# Patient Record
Sex: MALE | Race: WHITE | Employment: OTHER | URBAN - METROPOLITAN AREA
[De-identification: names, ages, dates, MRNs, and addresses within clinical notes are randomized per-mention and may not be internally consistent; named-entity substitution may affect disease eponyms.]

---

## 2017-12-27 ENCOUNTER — HOSPITAL ENCOUNTER (OUTPATIENT)
Age: 42
Discharge: HOME | End: 2017-12-27
Attending: FAMILY MEDICINE
Payer: COMMERCIAL

## 2017-12-27 VITALS
SYSTOLIC BLOOD PRESSURE: 146 MMHG | HEIGHT: 73 IN | TEMPERATURE: 98 F | OXYGEN SATURATION: 100 % | HEART RATE: 100 BPM | BODY MASS INDEX: 26.51 KG/M2 | WEIGHT: 200 LBS | DIASTOLIC BLOOD PRESSURE: 71 MMHG | RESPIRATION RATE: 20 BRPM

## 2017-12-27 DIAGNOSIS — J02.0 STREPTOCOCCAL SORE THROAT: Primary | ICD-10-CM

## 2017-12-27 PROCEDURE — 99204 OFFICE O/P NEW MOD 45 MIN: CPT

## 2017-12-27 PROCEDURE — 99203 OFFICE O/P NEW LOW 30 MIN: CPT

## 2017-12-27 PROCEDURE — 87430 STREP A AG IA: CPT

## 2017-12-27 RX ORDER — PAROXETINE HYDROCHLORIDE 25 MG/1
25 TABLET, FILM COATED, EXTENDED RELEASE ORAL EVERY MORNING
COMMUNITY
End: 2021-06-10

## 2017-12-27 RX ORDER — AMOXICILLIN 875 MG/1
875 TABLET, COATED ORAL 2 TIMES DAILY
Qty: 20 TABLET | Refills: 0 | Status: SHIPPED | OUTPATIENT
Start: 2017-12-27 | End: 2018-01-06

## 2017-12-27 NOTE — ED PROVIDER NOTES
Patient presents with:  Sore Throat      HPI:     Dylan Hubbard is a 40year old male who presents with for chief complaint of fever, nasal congestion, sore throat   X 1 days.     The patient denies complaints of headache, neck pain, ear pain, difficult organomegaly  Skin: Skin color, texture, turgor normal. No rashes or lesions      Assessment/Plan:     Labs performed this visit:    Recent Results (from the past 10 hour(s))  -[facility] POCT RAPID STREP   Collection Time: 12/27/17  8:28 AM   Result Value Ref Ra

## 2018-01-31 ENCOUNTER — HOSPITAL ENCOUNTER (OUTPATIENT)
Age: 43
Discharge: HOME | End: 2018-01-31
Attending: FAMILY MEDICINE
Payer: COMMERCIAL

## 2018-01-31 VITALS
SYSTOLIC BLOOD PRESSURE: 136 MMHG | HEART RATE: 97 BPM | WEIGHT: 200 LBS | RESPIRATION RATE: 18 BRPM | TEMPERATURE: 99 F | OXYGEN SATURATION: 99 % | DIASTOLIC BLOOD PRESSURE: 86 MMHG | HEIGHT: 73 IN | BODY MASS INDEX: 26.51 KG/M2

## 2018-01-31 DIAGNOSIS — J11.1 INFLUENZA-LIKE ILLNESS: Primary | ICD-10-CM

## 2018-01-31 LAB — S PYO AG THROAT QL: NEGATIVE

## 2018-01-31 PROCEDURE — 99213 OFFICE O/P EST LOW 20 MIN: CPT

## 2018-01-31 PROCEDURE — 99212 OFFICE O/P EST SF 10 MIN: CPT

## 2018-01-31 PROCEDURE — 87430 STREP A AG IA: CPT

## 2018-01-31 NOTE — ED PROVIDER NOTES
Patient presents with:  Cough/URI    HPI:     Margo Lanier is a 40year old male who presents with for chief complaint of fevers, chills, chest congestion, cough, headaches and body aches.   Onset of symptoms was 3 days  The patient denies complaints o rub or gallop, regular rate and rhythm  Abdomen: soft, non-tender; bowel sounds normal; no masses,  no organomegaly  Skin: Skin color, texture, turgor normal. No rashes or lesions      Assessment/Plan:     Labs performed this visit:    Recent Results (from

## 2018-01-31 NOTE — ED INITIAL ASSESSMENT (HPI)
\"not feeling well\"   Fever  Body aches  Symptoms  Chest congestion with phlegm  Monday pt.  Started with symptoms  -flu shot  No nausea or vomiting or diarrhea

## 2018-02-04 ENCOUNTER — HOSPITAL (OUTPATIENT)
Dept: OTHER | Age: 43
End: 2018-02-04
Attending: EMERGENCY MEDICINE

## 2018-02-04 LAB
ANALYZER ANC (IANC): NORMAL
ANION GAP SERPL CALC-SCNC: 12 MMOL/L (ref 10–20)
BASOPHILS # BLD: 0 THOUSAND/MCL (ref 0–0.3)
BASOPHILS NFR BLD: 0 %
BUN SERPL-MCNC: 12 MG/DL (ref 6–20)
BUN/CREAT SERPL: 13 (ref 7–25)
CALCIUM SERPL-MCNC: 9.3 MG/DL (ref 8.4–10.2)
CHLORIDE: 101 MMOL/L (ref 98–107)
CO2 SERPL-SCNC: 32 MMOL/L (ref 21–32)
CREAT SERPL-MCNC: 0.89 MG/DL (ref 0.67–1.17)
DIFFERENTIAL METHOD BLD: NORMAL
EOSINOPHIL # BLD: 0.1 THOUSAND/MCL (ref 0.1–0.5)
EOSINOPHIL NFR BLD: 3 %
ERYTHROCYTE [DISTWIDTH] IN BLOOD: 12.2 % (ref 11–15)
GLUCOSE SERPL-MCNC: 102 MG/DL (ref 65–99)
HEMATOCRIT: 43.1 % (ref 39–51)
HGB BLD-MCNC: 14.5 GM/DL (ref 13–17)
LYMPHOCYTES # BLD: 1.9 THOUSAND/MCL (ref 1–4.8)
LYMPHOCYTES NFR BLD: 33 %
MCH RBC QN AUTO: 29.4 PG (ref 26–34)
MCHC RBC AUTO-ENTMCNC: 33.6 GM/DL (ref 32–36.5)
MCV RBC AUTO: 87.4 FL (ref 78–100)
MONOCYTES # BLD: 0.3 THOUSAND/MCL (ref 0.3–0.9)
MONOCYTES NFR BLD: 5 %
NEUTROPHILS # BLD: 3.3 THOUSAND/MCL (ref 1.8–7.7)
NEUTROPHILS NFR BLD: 59 %
NEUTS SEG NFR BLD: NORMAL %
PERCENT NRBC: NORMAL
PLATELET # BLD: 227 THOUSAND/MCL (ref 140–450)
POTASSIUM SERPL-SCNC: 4 MMOL/L (ref 3.4–5.1)
PROCALCITONIN SERPL IA-MCNC: <0.05 NG/ML
RBC # BLD: 4.93 MILLION/MCL (ref 4.5–5.9)
SODIUM SERPL-SCNC: 141 MMOL/L (ref 135–145)
WBC # BLD: 5.7 THOUSAND/MCL (ref 4.2–11)

## 2018-06-14 PROBLEM — I83.893 VARICOSE VEINS OF BOTH LOWER EXTREMITIES WITH COMPLICATIONS: Status: ACTIVE | Noted: 2018-06-14

## 2019-01-08 ENCOUNTER — APPOINTMENT (OUTPATIENT)
Dept: LAB | Facility: HOSPITAL | Age: 44
End: 2019-01-08
Attending: NURSE PRACTITIONER
Payer: COMMERCIAL

## 2019-01-08 ENCOUNTER — OFFICE VISIT (OUTPATIENT)
Dept: GASTROENTEROLOGY | Facility: CLINIC | Age: 44
End: 2019-01-08
Payer: COMMERCIAL

## 2019-01-08 ENCOUNTER — TELEPHONE (OUTPATIENT)
Dept: GASTROENTEROLOGY | Facility: CLINIC | Age: 44
End: 2019-01-08

## 2019-01-08 VITALS
HEART RATE: 69 BPM | HEIGHT: 73 IN | SYSTOLIC BLOOD PRESSURE: 123 MMHG | BODY MASS INDEX: 27.3 KG/M2 | DIASTOLIC BLOOD PRESSURE: 64 MMHG | WEIGHT: 206 LBS

## 2019-01-08 DIAGNOSIS — R19.4 ALTERED BOWEL HABITS: ICD-10-CM

## 2019-01-08 DIAGNOSIS — R19.4 ALTERED BOWEL HABITS: Primary | ICD-10-CM

## 2019-01-08 LAB — IGA SERPL-MCNC: 272 MG/DL (ref 68–378)

## 2019-01-08 PROCEDURE — 82784 ASSAY IGA/IGD/IGG/IGM EACH: CPT | Performed by: NURSE PRACTITIONER

## 2019-01-08 PROCEDURE — 99212 OFFICE O/P EST SF 10 MIN: CPT | Performed by: NURSE PRACTITIONER

## 2019-01-08 PROCEDURE — 36415 COLL VENOUS BLD VENIPUNCTURE: CPT | Performed by: NURSE PRACTITIONER

## 2019-01-08 PROCEDURE — 99203 OFFICE O/P NEW LOW 30 MIN: CPT | Performed by: NURSE PRACTITIONER

## 2019-01-08 PROCEDURE — 83516 IMMUNOASSAY NONANTIBODY: CPT

## 2019-01-08 NOTE — TELEPHONE ENCOUNTER
Scheduled for:  Colonoscopy  Provider Name: Nyasia Crowley  Date:  1/25/19  Location: [facility]   Sedation:  IV  Time:  11am arrival 10am  Prep:colyte  Meds/Allergies Reconciled?:  Nubia Lara reviewed  Diagnosis with codes:  Altered bowel habits R19.4  Was patient inf

## 2019-01-08 NOTE — H&P
[address] [facility] [address] Gastroenterology                                                                                                  Clinic History and Physical     Pa Occasional etoh  - Denies illicit drug use   - Lives with spouse  - NSAIDs/ASA use: PRN    History, Medications, Allergies, ROS:      History reviewed. No pertinent past medical history.    Past Surgical History:   Procedure Laterality Date   • REMOVAL GALL wheezes or rales  GI: soft, non-tender exam in all quadrants without rigidity or guarding, non-distended, no abnormal bowel sounds noted, no masses are palpated, rectal exam deferred until endoscopic evaluation  : no CVA tenderness  Skin: dry, warm, no j Dr. Shamar Batista with IV twilight or MAC  -Prep: Split dose Colyte or equivalent  -Anti-platelets and anti-coagulants: None  -Diabetes meds: None  If MAC sedation @ [facility]:  -Patient denies ACE/ARB/ED Rx/herbal/diet supplements/recreational drugs  -Ho

## 2019-01-08 NOTE — PATIENT INSTRUCTIONS
1. Schedule colonoscopy with Dr. Slade Rick or Dr. Molly Christian w/ IV Bogata or MAC               2.  bowel prep from pharmacy - split dose Colyte    3. Continue all medications for procedure except: see office visit note    4.  Read all bowel prep inst

## 2019-01-09 LAB — TTG IGA SER-ACNC: 0.6 U/ML (ref ?–7)

## 2019-01-13 ENCOUNTER — LAB ENCOUNTER (OUTPATIENT)
Dept: LAB | Facility: HOSPITAL | Age: 44
End: 2019-01-13
Attending: NURSE PRACTITIONER
Payer: COMMERCIAL

## 2019-01-13 DIAGNOSIS — R19.4 ALTERED BOWEL HABITS: ICD-10-CM

## 2019-01-13 PROCEDURE — 87427 SHIGA-LIKE TOXIN AG IA: CPT

## 2019-01-13 PROCEDURE — 87046 STOOL CULTR AEROBIC BACT EA: CPT

## 2019-01-13 PROCEDURE — 87272 CRYPTOSPORIDIUM AG IF: CPT

## 2019-01-13 PROCEDURE — 87045 FECES CULTURE AEROBIC BACT: CPT

## 2019-01-13 PROCEDURE — 87493 C DIFF AMPLIFIED PROBE: CPT

## 2019-01-13 PROCEDURE — 87329 GIARDIA AG IA: CPT

## 2019-01-14 LAB
C DIFF TOX B STL QL: NEGATIVE
CRYPTOSP AG STL QL IA: NEGATIVE
G LAMBLIA AG STL QL IA: NEGATIVE

## 2019-01-15 NOTE — TELEPHONE ENCOUNTER
Please obtain PA for procedure being done at [facility].     Thank you, Tammy Goodson Small-Referral Specialist.

## 2019-01-15 NOTE — TELEPHONE ENCOUNTER
Pt Surgery/Procedure: colon [id number]  Pt insurance/number to contact: [id number]  Insurance ID# and group: [id number]/ [id number]  Dx: R19.4  CPT: 65772  Surgeon: Dr. Cinda Weston  Procedure scheduled where: [facility]   Procedure scheduled inpt/outpt: outpt  Date of surgery:

## 2019-01-16 ENCOUNTER — TELEPHONE (OUTPATIENT)
Dept: GASTROENTEROLOGY | Facility: CLINIC | Age: 44
End: 2019-01-16

## 2019-01-16 NOTE — TELEPHONE ENCOUNTER
----- Message from BRIGHT Baca sent at 1/9/2019  3:08 PM CST -----  Nursing: Please let the patient know that his celiac testing was negative.   I have not yet received the results of the stool tests but will forward on the results of this once the

## 2019-01-16 NOTE — TELEPHONE ENCOUNTER
[address]- FYI stool culture results are back in -- please review and send back to RN pool to call patient.

## 2019-01-16 NOTE — TELEPHONE ENCOUNTER
Notified patient per [facility] result note from 1/16/19 that both stool culture/and celiac testing was negative. No indication of any infection. Patient fully understood all that was stated and had no further questions/concerns.

## 2019-01-17 NOTE — TELEPHONE ENCOUNTER
----- Message from BRIGHT Marie sent at 1/16/2019 12:44 PM CST -----  Nursing: Please let the patient know that his stool testing was negative. No indication of any infection.

## 2019-01-18 ENCOUNTER — TELEPHONE (OUTPATIENT)
Dept: GASTROENTEROLOGY | Facility: CLINIC | Age: 44
End: 2019-01-18

## 2019-01-18 DIAGNOSIS — R19.4 ALTERED BOWEL HABITS: Primary | ICD-10-CM

## 2019-01-18 NOTE — TELEPHONE ENCOUNTER
Patient is cancelling his colonoscopy  due to busy work schedule. Would like to reschedule at a later time. Cindy Leyva Appointment cancelled in [place] [id number]. Surgical case request sent to endo. Message sent to doctor. Message sent to GI schedulers.

## 2019-05-08 NOTE — TELEPHONE ENCOUNTER
CB, unable to LM to schedule procedure, mailbox full. Please transfer to [facility] at ext [phone] or [phone] for scheduling. Or please transfer to [facility] in GI if unavailable.

## 2019-08-02 NOTE — TELEPHONE ENCOUNTER
CBLM to schedule procedure. Please transfer to [facility] in GI    3 attempts were made to contact this patient to schedule a procedure.     I sent a \"no response\" letter out today     Closing this TE

## 2021-06-10 PROBLEM — E55.9 VITAMIN D DEFICIENCY: Status: ACTIVE | Noted: 2021-06-10

## 2022-01-26 PROBLEM — N13.8 BPH WITH OBSTRUCTION/LOWER URINARY TRACT SYMPTOMS: Status: ACTIVE | Noted: 2022-01-26

## 2022-01-26 PROBLEM — N40.1 BPH WITH OBSTRUCTION/LOWER URINARY TRACT SYMPTOMS: Status: ACTIVE | Noted: 2022-01-26

## 2023-01-09 ENCOUNTER — HOSPITAL ENCOUNTER (OUTPATIENT)
Age: 48
Discharge: HOME | End: 2023-01-09
Payer: COMMERCIAL

## 2023-01-09 VITALS
RESPIRATION RATE: 20 BRPM | TEMPERATURE: 98 F | OXYGEN SATURATION: 100 % | SYSTOLIC BLOOD PRESSURE: 131 MMHG | DIASTOLIC BLOOD PRESSURE: 69 MMHG | HEART RATE: 79 BPM

## 2023-01-09 DIAGNOSIS — J06.9 VIRAL URI: Primary | ICD-10-CM

## 2023-01-09 LAB
POCT INFLUENZA A: NEGATIVE
POCT INFLUENZA B: NEGATIVE
S PYO AG THROAT QL: NEGATIVE
SARS-COV-2 RNA RESP QL NAA+PROBE: NOT DETECTED

## 2023-01-09 PROCEDURE — 87502 INFLUENZA DNA AMP PROBE: CPT | Performed by: NURSE PRACTITIONER

## 2023-01-09 PROCEDURE — 87880 STREP A ASSAY W/OPTIC: CPT | Performed by: NURSE PRACTITIONER

## 2023-01-09 PROCEDURE — U0002 COVID-19 LAB TEST NON-CDC: HCPCS | Performed by: NURSE PRACTITIONER

## 2023-01-09 PROCEDURE — 99203 OFFICE O/P NEW LOW 30 MIN: CPT | Performed by: NURSE PRACTITIONER

## 2023-01-09 NOTE — ED INITIAL ASSESSMENT (HPI)
Patient is here with complaints of a sore throat for the last week and feeling tired and wiped out for the last week.

## 2023-05-22 ENCOUNTER — HOSPITAL ENCOUNTER (OUTPATIENT)
Dept: CT IMAGING | Age: 48
Discharge: HOME | End: 2023-05-22
Attending: OTOLARYNGOLOGY

## 2023-05-22 DIAGNOSIS — I88.9 CERVICAL LYMPHADENITIS: ICD-10-CM

## 2023-05-22 DIAGNOSIS — I88.9 CERVICAL LYMPHADENITIS: Primary | ICD-10-CM

## 2023-05-22 PROCEDURE — 10002805 HB CONTRAST AGENT: Performed by: OTOLARYNGOLOGY

## 2023-05-22 PROCEDURE — G1004 CDSM NDSC: HCPCS

## 2023-05-22 PROCEDURE — 70491 CT SOFT TISSUE NECK W/DYE: CPT

## 2023-05-22 RX ADMIN — IOHEXOL 80 ML: 350 INJECTION, SOLUTION INTRAVENOUS at 18:13

## 2023-05-25 ENCOUNTER — HOSPITAL ENCOUNTER (EMERGENCY)
Age: 48
Discharge: HOME | End: 2023-05-25
Attending: EMERGENCY MEDICINE

## 2023-05-25 ENCOUNTER — APPOINTMENT (OUTPATIENT)
Dept: CT IMAGING | Age: 48
End: 2023-05-25
Attending: EMERGENCY MEDICINE

## 2023-05-25 VITALS
RESPIRATION RATE: 16 BRPM | OXYGEN SATURATION: 98 % | TEMPERATURE: 99.5 F | HEIGHT: 73 IN | WEIGHT: 197.97 LBS | BODY MASS INDEX: 26.24 KG/M2 | DIASTOLIC BLOOD PRESSURE: 89 MMHG | HEART RATE: 78 BPM | SYSTOLIC BLOOD PRESSURE: 149 MMHG

## 2023-05-25 DIAGNOSIS — R07.0 THROAT PAIN: Primary | ICD-10-CM

## 2023-05-25 LAB
ALBUMIN SERPL-MCNC: 3.8 G/DL (ref 3.6–5.1)
ALBUMIN/GLOB SERPL: 0.7 {RATIO} (ref 1–2.4)
ALP SERPL-CCNC: 77 UNITS/L (ref 45–117)
ALT SERPL-CCNC: 41 UNITS/L
ANION GAP SERPL CALC-SCNC: 7 MMOL/L (ref 7–19)
AST SERPL-CCNC: 23 UNITS/L
BASOPHILS # BLD: 0 K/MCL (ref 0–0.3)
BASOPHILS NFR BLD: 0 %
BILIRUB SERPL-MCNC: 0.5 MG/DL (ref 0.2–1)
BUN SERPL-MCNC: 16 MG/DL (ref 6–20)
BUN/CREAT SERPL: 19 (ref 7–25)
CALCIUM SERPL-MCNC: 9.7 MG/DL (ref 8.4–10.2)
CHLORIDE SERPL-SCNC: 104 MMOL/L (ref 97–110)
CO2 SERPL-SCNC: 30 MMOL/L (ref 21–32)
CREAT SERPL-MCNC: 0.83 MG/DL (ref 0.67–1.17)
CRP SERPL-MCNC: 9.8 MG/DL
DEPRECATED RDW RBC: 38.5 FL (ref 39–50)
EOSINOPHIL # BLD: 0 K/MCL (ref 0–0.5)
EOSINOPHIL NFR BLD: 0 %
ERYTHROCYTE [DISTWIDTH] IN BLOOD: 11.9 % (ref 11–15)
ERYTHROCYTE [SEDIMENTATION RATE] IN BLOOD BY WESTERGREN METHOD: 73 MM/HR (ref 0–20)
FASTING DURATION TIME PATIENT: ABNORMAL H
GFR SERPLBLD BASED ON 1.73 SQ M-ARVRAT: >90 ML/MIN
GLOBULIN SER-MCNC: 5.1 G/DL (ref 2–4)
GLUCOSE SERPL-MCNC: 145 MG/DL (ref 70–99)
HCT VFR BLD CALC: 47.2 % (ref 39–51)
HGB BLD-MCNC: 15.4 G/DL (ref 13–17)
IMM GRANULOCYTES # BLD AUTO: 0.1 K/MCL (ref 0–0.2)
IMM GRANULOCYTES # BLD: 0 %
LYMPHOCYTES # BLD: 0.5 K/MCL (ref 1–4.8)
LYMPHOCYTES NFR BLD: 3 %
MCH RBC QN AUTO: 29.1 PG (ref 26–34)
MCHC RBC AUTO-ENTMCNC: 32.6 G/DL (ref 32–36.5)
MCV RBC AUTO: 89.2 FL (ref 78–100)
MONOCYTES # BLD: 0.4 K/MCL (ref 0.3–0.9)
MONOCYTES NFR BLD: 3 %
NEUTROPHILS # BLD: 13.8 K/MCL (ref 1.8–7.7)
NEUTROPHILS NFR BLD: 94 %
NRBC BLD MANUAL-RTO: 0 /100 WBC
PLATELET # BLD AUTO: 302 K/MCL (ref 140–450)
POTASSIUM SERPL-SCNC: 3.7 MMOL/L (ref 3.4–5.1)
PROT SERPL-MCNC: 8.9 G/DL (ref 6.4–8.2)
RAINBOW EXTRA TUBES HOLD SPECIMEN: NORMAL
RAINBOW EXTRA TUBES HOLD SPECIMEN: NORMAL
RBC # BLD: 5.29 MIL/MCL (ref 4.5–5.9)
SODIUM SERPL-SCNC: 137 MMOL/L (ref 135–145)
TSH SERPL-ACNC: 0.49 MCUNITS/ML (ref 0.35–5)
WBC # BLD: 14.8 K/MCL (ref 4.2–11)

## 2023-05-25 PROCEDURE — 70491 CT SOFT TISSUE NECK W/DYE: CPT

## 2023-05-25 PROCEDURE — 96365 THER/PROPH/DIAG IV INF INIT: CPT

## 2023-05-25 PROCEDURE — 10002807 HB RX 258: Performed by: EMERGENCY MEDICINE

## 2023-05-25 PROCEDURE — 10002805 HB CONTRAST AGENT: Performed by: EMERGENCY MEDICINE

## 2023-05-25 PROCEDURE — 85652 RBC SED RATE AUTOMATED: CPT | Performed by: EMERGENCY MEDICINE

## 2023-05-25 PROCEDURE — 80053 COMPREHEN METABOLIC PANEL: CPT | Performed by: EMERGENCY MEDICINE

## 2023-05-25 PROCEDURE — 99283 EMERGENCY DEPT VISIT LOW MDM: CPT

## 2023-05-25 PROCEDURE — 96375 TX/PRO/DX INJ NEW DRUG ADDON: CPT

## 2023-05-25 PROCEDURE — 86140 C-REACTIVE PROTEIN: CPT | Performed by: EMERGENCY MEDICINE

## 2023-05-25 PROCEDURE — 84443 ASSAY THYROID STIM HORMONE: CPT | Performed by: EMERGENCY MEDICINE

## 2023-05-25 PROCEDURE — 85025 COMPLETE CBC W/AUTO DIFF WBC: CPT | Performed by: EMERGENCY MEDICINE

## 2023-05-25 PROCEDURE — 10002800 HB RX 250 W HCPCS: Performed by: EMERGENCY MEDICINE

## 2023-05-25 RX ORDER — PAROXETINE HCL 25 MG
TABLET, EXTENDED RELEASE 24 HR ORAL
COMMUNITY
Start: 2023-04-13

## 2023-05-25 RX ORDER — KETOROLAC TROMETHAMINE 15 MG/ML
15 INJECTION, SOLUTION INTRAMUSCULAR; INTRAVENOUS ONCE
Status: COMPLETED | OUTPATIENT
Start: 2023-05-25 | End: 2023-05-25

## 2023-05-25 RX ORDER — DEXAMETHASONE SODIUM PHOSPHATE 4 MG/ML
4 INJECTION, SOLUTION INTRA-ARTICULAR; INTRALESIONAL; INTRAMUSCULAR; INTRAVENOUS; SOFT TISSUE ONCE
Status: COMPLETED | OUTPATIENT
Start: 2023-05-25 | End: 2023-05-25

## 2023-05-25 RX ADMIN — DEXAMETHASONE SODIUM PHOSPHATE 4 MG: 4 INJECTION, SOLUTION INTRAMUSCULAR; INTRAVENOUS at 10:56

## 2023-05-25 RX ADMIN — SODIUM CHLORIDE 1000 ML: 0.9 INJECTION, SOLUTION INTRAVENOUS at 11:13

## 2023-05-25 RX ADMIN — KETOROLAC TROMETHAMINE 15 MG: 15 INJECTION, SOLUTION INTRAMUSCULAR; INTRAVENOUS at 10:56

## 2023-05-25 RX ADMIN — IOHEXOL 75 ML: 350 INJECTION, SOLUTION INTRAVENOUS at 12:20

## 2023-05-25 RX ADMIN — SODIUM CHLORIDE 1.5 G: 900 INJECTION INTRAVENOUS at 11:13

## 2023-06-13 DIAGNOSIS — E04.9 GOITER: Primary | ICD-10-CM

## 2023-06-14 ENCOUNTER — LAB SERVICES (OUTPATIENT)
Dept: LAB | Age: 48
End: 2023-06-14

## 2023-06-14 DIAGNOSIS — E04.9 GOITER: ICD-10-CM

## 2023-06-14 LAB
ALBUMIN SERPL-MCNC: 3.6 G/DL (ref 3.6–5.1)
ALBUMIN/GLOB SERPL: 0.9 {RATIO} (ref 1–2.4)
ALP SERPL-CCNC: 63 UNITS/L (ref 45–117)
ALT SERPL-CCNC: 75 UNITS/L
ANION GAP SERPL CALC-SCNC: 6 MMOL/L (ref 7–19)
AST SERPL-CCNC: 30 UNITS/L
BASOPHILS # BLD: 0 K/MCL (ref 0–0.3)
BASOPHILS NFR BLD: 1 %
BILIRUB SERPL-MCNC: 0.3 MG/DL (ref 0.2–1)
BUN SERPL-MCNC: 16 MG/DL (ref 6–20)
BUN/CREAT SERPL: 20 (ref 7–25)
CALCIUM SERPL-MCNC: 9.1 MG/DL (ref 8.4–10.2)
CHLORIDE SERPL-SCNC: 106 MMOL/L (ref 97–110)
CO2 SERPL-SCNC: 32 MMOL/L (ref 21–32)
CREAT SERPL-MCNC: 0.81 MG/DL (ref 0.67–1.17)
DEPRECATED RDW RBC: 40.8 FL (ref 39–50)
EOSINOPHIL # BLD: 0.3 K/MCL (ref 0–0.5)
EOSINOPHIL NFR BLD: 5 %
ERYTHROCYTE [DISTWIDTH] IN BLOOD: 12.2 % (ref 11–15)
FASTING DURATION TIME PATIENT: 0 HOURS (ref 0–999)
GFR SERPLBLD BASED ON 1.73 SQ M-ARVRAT: >90 ML/MIN
GLOBULIN SER-MCNC: 4.1 G/DL (ref 2–4)
GLUCOSE SERPL-MCNC: 103 MG/DL (ref 70–99)
HCT VFR BLD CALC: 44.9 % (ref 39–51)
HGB BLD-MCNC: 14.3 G/DL (ref 13–17)
IMM GRANULOCYTES # BLD AUTO: 0 K/MCL (ref 0–0.2)
IMM GRANULOCYTES # BLD: 0 %
LYMPHOCYTES # BLD: 1.9 K/MCL (ref 1–4.8)
LYMPHOCYTES NFR BLD: 32 %
MCH RBC QN AUTO: 28.8 PG (ref 26–34)
MCHC RBC AUTO-ENTMCNC: 31.8 G/DL (ref 32–36.5)
MCV RBC AUTO: 90.3 FL (ref 78–100)
MONOCYTES # BLD: 0.4 K/MCL (ref 0.3–0.9)
MONOCYTES NFR BLD: 7 %
NEUTROPHILS # BLD: 3.3 K/MCL (ref 1.8–7.7)
NEUTROPHILS NFR BLD: 55 %
NRBC BLD MANUAL-RTO: 0 /100 WBC
PLATELET # BLD AUTO: 241 K/MCL (ref 140–450)
POTASSIUM SERPL-SCNC: 3.9 MMOL/L (ref 3.4–5.1)
PROT SERPL-MCNC: 7.7 G/DL (ref 6.4–8.2)
RBC # BLD: 4.97 MIL/MCL (ref 4.5–5.9)
SODIUM SERPL-SCNC: 140 MMOL/L (ref 135–145)
T3FREE SERPL-MCNC: 2.7 PG/ML (ref 2.2–4)
T4 FREE SERPL-MCNC: 1 NG/DL (ref 0.8–1.5)
TSH SERPL-ACNC: 0.47 MCUNITS/ML (ref 0.35–5)
WBC # BLD: 5.9 K/MCL (ref 4.2–11)

## 2023-06-14 PROCEDURE — 84481 FREE ASSAY (FT-3): CPT | Performed by: INTERNAL MEDICINE

## 2023-06-14 PROCEDURE — 84439 ASSAY OF FREE THYROXINE: CPT | Performed by: INTERNAL MEDICINE

## 2023-06-14 PROCEDURE — 80050 GENERAL HEALTH PANEL: CPT | Performed by: INTERNAL MEDICINE

## 2023-06-14 PROCEDURE — 36415 COLL VENOUS BLD VENIPUNCTURE: CPT | Performed by: INTERNAL MEDICINE

## 2023-07-27 ENCOUNTER — APPOINTMENT (OUTPATIENT)
Dept: ULTRASOUND IMAGING | Age: 48
End: 2023-07-27
Attending: OTOLARYNGOLOGY

## 2023-08-21 ENCOUNTER — HOSPITAL ENCOUNTER (OUTPATIENT)
Dept: ULTRASOUND IMAGING | Age: 48
Discharge: HOME | End: 2023-08-21
Attending: OTOLARYNGOLOGY

## 2023-08-21 DIAGNOSIS — E04.9 GOITER: ICD-10-CM

## 2023-08-21 PROCEDURE — 76536 US EXAM OF HEAD AND NECK: CPT

## 2023-08-22 DIAGNOSIS — E04.9 GOITER: Primary | ICD-10-CM

## 2023-09-05 ENCOUNTER — TELEPHONE (OUTPATIENT)
Dept: PREADMISSION TESTING | Age: 48
End: 2023-09-05

## 2023-09-05 VITALS — BODY MASS INDEX: 27.57 KG/M2 | WEIGHT: 208 LBS | HEIGHT: 73 IN

## 2023-09-05 RX ORDER — CHLORAL HYDRATE 500 MG
2 CAPSULE ORAL DAILY
COMMUNITY

## 2023-09-05 ASSESSMENT — ACTIVITIES OF DAILY LIVING (ADL)
ADL_SCORE: 12
ADL_BEFORE_ADMISSION: INDEPENDENT

## 2023-09-06 ENCOUNTER — HOSPITAL ENCOUNTER (OUTPATIENT)
Dept: INTERVENTIONAL RADIOLOGY/VASCULAR | Age: 48
Discharge: HOME | End: 2023-09-06
Attending: OTOLARYNGOLOGY

## 2023-09-06 DIAGNOSIS — E04.9 GOITER: ICD-10-CM

## 2023-09-06 PROCEDURE — 88173 CYTOPATH EVAL FNA REPORT: CPT | Performed by: OTOLARYNGOLOGY

## 2023-09-06 PROCEDURE — 10005 FNA BX W/US GDN 1ST LES: CPT

## 2023-09-06 PROCEDURE — 10002801 HB RX 250 W/O HCPCS: Performed by: RADIOLOGY

## 2023-09-06 RX ORDER — LIDOCAINE HYDROCHLORIDE 10 MG/ML
INJECTION, SOLUTION INFILTRATION; PERINEURAL PRN
Status: COMPLETED | OUTPATIENT
Start: 2023-09-06 | End: 2023-09-06

## 2023-09-06 RX ADMIN — LIDOCAINE HYDROCHLORIDE 10 ML: 10 INJECTION, SOLUTION INFILTRATION; PERINEURAL at 11:16

## 2023-09-07 LAB
ASR DISCLAIMER: NORMAL
CASE RPRT: NORMAL
CLINICAL INFO: NORMAL
PATH REPORT.FINAL DX SPEC: NORMAL
PATH REPORT.GROSS SPEC: NORMAL
PATH REPORT.MICROSCOPIC SPEC OTHER STN: NORMAL

## 2024-10-24 DIAGNOSIS — E04.9 GOITER: Primary | ICD-10-CM

## 2024-11-15 ENCOUNTER — HOSPITAL ENCOUNTER (OUTPATIENT)
Dept: ULTRASOUND IMAGING | Age: 49
End: 2024-11-15

## 2024-11-15 DIAGNOSIS — E04.9 GOITER: ICD-10-CM

## 2024-11-15 PROCEDURE — 76536 US EXAM OF HEAD AND NECK: CPT

## 2025-02-08 DIAGNOSIS — E04.9 GOITER: Primary | ICD-10-CM

## 2025-07-09 ENCOUNTER — OFFICE VISIT (OUTPATIENT)
Facility: CLINIC | Age: 50
End: 2025-07-09

## 2025-07-09 VITALS
WEIGHT: 203 LBS | SYSTOLIC BLOOD PRESSURE: 120 MMHG | BODY MASS INDEX: 26.9 KG/M2 | HEIGHT: 73 IN | DIASTOLIC BLOOD PRESSURE: 60 MMHG

## 2025-07-09 DIAGNOSIS — I83.813 VARICOSE VEINS OF BILATERAL LOWER EXTREMITIES WITH PAIN: Primary | ICD-10-CM

## 2025-07-09 PROCEDURE — 99024 POSTOP FOLLOW-UP VISIT: CPT

## 2025-07-09 NOTE — PROGRESS NOTES
VASCULAR SURGERY CONSULT NOTE      Alexandr Carcamo   :  3/28/1975  MR#  [id number]    REFERRING PHYSICIAN:  No ref. provider found  PRIMARY CARE PHYSICIAN:  Sergey Thomson MD    Chief Complaint   Patient presents with    Referral     Referral from Dr. Sergey Thomson for Bilateral Varicose Veins.  The patient has over thirty year history of Bilateral Varicose veins.  He has seen Vascular in the past and has never had surgery.            HPI:    The patient is a 50 year old male who has been referred to the clinic today for an evaluation of his bilateral lower extremity heaviness, tiredness, itchiness, and pain after prolonged standing. The pain is reported in his medial thighs, calves, and distal legs. This is interfering with his activities of daily living and exercise. He has not worn compression stockings in the recent past. No history of endovenous ablations performed in the past.     PAST MEDICAL HISTORY:   Past Medical History[1]    PAST SURGICAL HISTORY:   Past Surgical History[2]     MEDICATIONS:   Current Medications[3]    ALLERGIES:   Allergies[4]    SOCIAL HISTORY:   Short Social Hx on File[5]     FAMILY HISTORY:   Family History[6]    ROS:   A 12 point review of systems with pertinent positives and negatives listed in the HPI.    PHYSICAL EXAM:   /60 (BP Location: Left arm, Patient Position: Sitting)   Ht 6' 1\" (1.854 m)   Wt 203 lb (92.1 kg)   BMI 26.78 kg/m²   GENERAL: alert and orientated X 3, well developed, well nourished, in no apparent distress  HEENT: ears and throat are clear  NECK: supple, no lymphadenopathy, thyroid wnl  CAROTID: No bruits  RESPIRATORY: no rales, rhonchi, or wheezes B  CARDIO: RRR without murmur, no murmur, no gallop   ABDOMEN: soft, non-tender with no palpable aneurysm or masses  BACK: normal, no tenderness  SKIN: no rashes, warm and dry  NEURO/PSYCH: orientated x3, normal mood and affect, no sensory or motor deficit  EXTREMITIES: full range of motion, no  tenderness or edema in either leg.   VASCULAR  Pulse exam right: femoral 2+, DP  2+, PT  2+  Pulse exam left: femoral  2+, DP  2+, PT  2+      Vein Assessment:      Moderately large bilateral  LE bulgy varicose veins with no significant hemosiderin deposition.     IMPRESSION:   Bilateral  lower extremity pain due to venous insufficiency.   Symptomatic thigh and calf varicosities.    PLAN:     We reviewed the options for management of venous insufficiency, including conservative therapy, sclerotherapy, stab phlebectomy, and endovenous laser ablation. The patient was educated in the benign condition of venous disease.  I have given the patient a prescription for Grade II compression stockings (20-30 mmHg, thigh-highs). We reviewed the importance of wearing these daily and consistently. We also reviewed other types of conservative measures, such as periodic leg elevation, walking for exercise, analgesic use, and the avoidance of prolonged standing.  I have sent the patient for a venous reflux ultrasound. Should the ultrasound study reveal venous reflux with dilation and he does not have relief of symptoms with conservative therapy, then endovenous laser ablation and stabs phlebectomy may be possible treatment options.  I explained to the patient that his insurance company may require a 6-12 week trial period of conservative therapy prior to authorizing endovenous ablation treatment.  The patient understood and agreed to proceed with this treatment plan, all of his questions were answered during this clinic visit.       Thank you for allowing to participate in the care of your patient.     EVONNE Sarah  Division of Vascular Surgery.        [1]   Past Medical History:   Asymptomatic varicose veins    Family hx of prostate cancer    Panic disorder    Vitamin D deficiency   [2]   Past Surgical History:  Procedure Laterality Date    Removal gallbladder     [3]   Current Outpatient Medications   Medication Sig  Dispense Refill    PAXIL CR 25 MG Oral Tablet 24 Hr Take 1 tablet (25 mg total) by mouth every morning. 30 tablet 11   [4]   Allergies  Allergen Reactions    Moxifloxacin ITCHING and SWELLING     Swelling in lips   [5]   Social History  Socioeconomic History    Marital status:      Spouse name: Selina    Number of children: 2    Years of education: highschool   Occupational History    Occupation: [employer] business-[employer]     Comment: [employer]   Tobacco Use    Smoking status: Never    Smokeless tobacco: Never   Vaping Use    Vaping status: Never Used   Substance and Sexual Activity    Alcohol use: Yes     Comment: a couple times a week     Drug use: Never    Sexual activity: Yes     Partners: Female   Social History Narrative    Lives in [place]         since 2008     Social Drivers of Health      Received from [facility]   [6]   Family History  Problem Relation Age of Onset    Cancer Father         lung and prostate cancer

## (undated) NOTE — LETTER
8/2/2019              [address]        [place] [id number]         Dear Donovan Biswas,    This letter is to inform you that our office has made several attempts to reach you by phone without success.   We were attempting to c

## (undated) NOTE — LETTER
7/9/2025          Medical Grade Compression Hose     Patient: Alexandr Carcamo     YOB: 1975         Diagnosis: Varicose Veins of Bilateral Legs with Pain & Inflammation: I83.813  Compression: 20-30mmHg- Moderate  Style: Thigh-High        Amount: X 2  HCPS: - Knee High          Physician Signature: _____________________  Date: 7/9/2025     BRIGHT Sarah